# Patient Record
Sex: MALE | Race: WHITE | Employment: OTHER | URBAN - METROPOLITAN AREA
[De-identification: names, ages, dates, MRNs, and addresses within clinical notes are randomized per-mention and may not be internally consistent; named-entity substitution may affect disease eponyms.]

---

## 2020-06-16 VITALS
DIASTOLIC BLOOD PRESSURE: 90 MMHG | BODY MASS INDEX: 30.48 KG/M2 | TEMPERATURE: 98.7 F | WEIGHT: 225 LBS | HEIGHT: 72 IN | SYSTOLIC BLOOD PRESSURE: 140 MMHG

## 2020-06-16 DIAGNOSIS — Z80.42 FAMILY HISTORY OF MALIGNANT NEOPLASM OF PROSTATE: ICD-10-CM

## 2020-06-16 RX ORDER — CIPROFLOXACIN 500 MG/1
TABLET ORAL 2 TIMES DAILY
COMMUNITY

## 2020-06-17 LAB — PSA, EXTERNAL: 0.9

## 2020-07-10 PROBLEM — N40.2 PROSTATE NODULE WITHOUT URINARY OBSTRUCTION: Status: ACTIVE | Noted: 2020-07-10

## 2020-07-21 ENCOUNTER — OFFICE VISIT (OUTPATIENT)
Dept: UROLOGY | Age: 60
End: 2020-07-21
Payer: COMMERCIAL

## 2020-07-21 VITALS — TEMPERATURE: 98.7 F

## 2020-07-21 DIAGNOSIS — N40.2 PROSTATE NODULE WITHOUT URINARY OBSTRUCTION: ICD-10-CM

## 2020-07-21 DIAGNOSIS — Z80.42 FAMILY HISTORY OF PROSTATE CANCER: Primary | ICD-10-CM

## 2020-07-21 PROCEDURE — 76942 ECHO GUIDE FOR BIOPSY: CPT | Performed by: UROLOGY

## 2020-07-21 PROCEDURE — 76872 US TRANSRECTAL: CPT | Performed by: UROLOGY

## 2020-07-21 PROCEDURE — 55700 PR BIOPSY OF PROSTATE,NEEDLE/PUNCH: CPT | Performed by: UROLOGY

## 2020-07-21 NOTE — PROGRESS NOTES
There are no care plans to display for this patient. Procedures   Prostate biopsy        Transrectal Ultrasound of Prostate with Biopsy of Prostate and/or Seminal Vesicles    Patient: Erlinda Safer                                                             Date: 7/21/2020    Indications:  PSA:  0.9    Palpable Nodule: yes    Procedure: The patient was given Cipro and Fleet enema prep prior to the procedure described below. He was placed in the left lateral decubitus position with knees drawn up. Anesthesia: Prostate Block - 1% Xylocaine 6cc was injected bilaterally at apex and base. An endorectal probe was placed in the rectum. Transverse and sagittal images of the prostate and seminal vesicles were obtained. Prostate measured. 3.57 x 4.42 x 2.65cm  Prostate volume 41.82cc     Using the needle guide on the probe, a biopsy needle was used to obtain biopsies of the prostate. If seen, abnormally appearing areas were targeted along with systematic biopsies from each side. Tissue cores were placed in formalin jars and sent for pathologic review. 12 zone template. The probe was removed and the patient was observed. He was advised to finish his prescribed antibiotics and avoid strenuous physical and sexual activity for several days. He was told to call our office if he developed fever, excessive or persistent blood in the urine/stool, or difficulty voiding. We arranged follow-up so that we could review the results of this biopsy. Biopsy-Number of cores:     14 samples, 12 zones. Additional samples from right apex.           Prostate Exam: Right apex hard  Prostatic Volume: 41.82 grams      Roya Buitrago MD

## 2020-07-21 NOTE — PATIENT INSTRUCTIONS
Take Tylenol for pain or low grade fever. 325mg, 500mg or 650mg acceptable doses. Avoid ibuprofen, aspirin, naproxen or other NSAIDs until no further blood seen. These medications can increase bleeding. Hydrate well. Expect blood in the stool 1-2 days. Expect blood in the urine or semen, off and on, for weeks.

## 2020-07-28 ENCOUNTER — OFFICE VISIT (OUTPATIENT)
Dept: UROLOGY | Age: 60
End: 2020-07-28
Payer: COMMERCIAL

## 2020-07-28 VITALS
HEIGHT: 72 IN | DIASTOLIC BLOOD PRESSURE: 88 MMHG | SYSTOLIC BLOOD PRESSURE: 152 MMHG | BODY MASS INDEX: 30.48 KG/M2 | WEIGHT: 225 LBS

## 2020-07-28 DIAGNOSIS — Z80.42 FAMILY HISTORY OF PROSTATE CANCER: Primary | ICD-10-CM

## 2020-07-28 DIAGNOSIS — N40.2 PROSTATE NODULE WITHOUT URINARY OBSTRUCTION: ICD-10-CM

## 2020-07-28 PROCEDURE — 99213 OFFICE O/P EST LOW 20 MIN: CPT | Performed by: UROLOGY

## 2020-07-28 NOTE — PROGRESS NOTES
HISTORY OF PRESENT ILLNESS  Xander Montoya is a 64 y.o. male. He is here for an abnormal prostate exam.   He has a hard area of the prostate with a normal PSA. NOEMY 6/16/2020 with a firm area on the right side of his prostate. PSA on 6/16/20 was 0.9 with %free 44.4. Prostate biopsy done 7/21/2020 was benign. Problem List  Date Reviewed: 7/28/2020          Codes Class Noted    Prostate nodule without urinary obstruction ICD-10-CM: N40.2  ICD-9-CM: 600.10  7/10/2020    Overview Addendum 7/28/2020 11:44 AM by Lexy Anderson NP     NOEMY 6/16/2020 with a firm area on the right side of his prostate. PSA on 6/16/20 was 0.9 with %free 44.4. Prostate biopsy done 7/21/2020 was benign. Family history of prostate cancer ICD-10-CM: Z80.42  ICD-9-CM: V16.42  6/16/2020    Overview Signed 6/16/2020  4:28 PM by Rissa Phillip     His brother was recently diagnosed with prostate cancer. 06- visit He wants to resume prostate cancer screening. He has an abnormal exam today and needs further evaluation. Review of Systems   All other systems reviewed and are negative. Physical Exam  Constitutional:       General: He is not in acute distress. Appearance: Normal appearance. HENT:      Head: Normocephalic. Nose: Nose normal.      Mouth/Throat:      Mouth: Mucous membranes are moist.   Eyes:      Pupils: Pupils are equal, round, and reactive to light. Neck:      Musculoskeletal: Normal range of motion and neck supple. Cardiovascular:      Rate and Rhythm: Normal rate. Pulmonary:      Effort: Pulmonary effort is normal. No respiratory distress. Breath sounds: Normal breath sounds. No wheezing or rhonchi. Genitourinary:     Prostate: Enlarged (1+firm, without dominant nodule today). Not tender. Prostate nodules: apex with bilateral nodules, probably vascular. Rectum: Normal. No mass. Musculoskeletal: Normal range of motion.    Skin:     General: Skin is warm and dry. Neurological:      General: No focal deficit present. Mental Status: He is alert and oriented to person, place, and time. Psychiatric:         Mood and Affect: Mood normal.         Behavior: Behavior normal.         Lab Results   Component Value Date/Time    PSA, External 0.9 06/17/2020         ASSESSMENT and PLAN  Diagnoses and all orders for this visit:    1. Family history of prostate cancer    2. Prostate nodule without urinary obstruction  Assessment & Plan:  Biopsy was benign. Ok to resume annual follow up. Orders:  -     PSA, DIAGNOSTIC (PROSTATE SPECIFIC AG)     Biopsy was benign. Ok to resume annual follow up.    Batshvea Alonso MD

## 2020-08-26 ENCOUNTER — OFFICE VISIT (OUTPATIENT)
Dept: UROLOGY | Age: 60
End: 2020-08-26
Payer: COMMERCIAL

## 2020-08-26 VITALS
WEIGHT: 216 LBS | TEMPERATURE: 98.1 F | HEIGHT: 72 IN | BODY MASS INDEX: 29.26 KG/M2 | DIASTOLIC BLOOD PRESSURE: 86 MMHG | SYSTOLIC BLOOD PRESSURE: 146 MMHG

## 2020-08-26 DIAGNOSIS — Z80.42 FAMILY HISTORY OF PROSTATE CANCER: ICD-10-CM

## 2020-08-26 DIAGNOSIS — N40.2 PROSTATE NODULE WITHOUT URINARY OBSTRUCTION: Primary | ICD-10-CM

## 2020-08-26 DIAGNOSIS — N20.0 KIDNEY STONE: ICD-10-CM

## 2020-08-26 PROCEDURE — 99214 OFFICE O/P EST MOD 30 MIN: CPT | Performed by: UROLOGY

## 2020-08-26 NOTE — ASSESSMENT & PLAN NOTE
6mm mid ureteral stone on right. We discussed ESWL. Check KUB. If visible then plan on ESWL. The patient was counseled on the risks, benefits and expected course of surgery. Surgery has risks of bleeding, infection, injury, pain, death or other consequences. Some specific risks of surgery were discussed as well. He wished to proceed.

## 2020-08-26 NOTE — PROGRESS NOTES
HISTORY OF PRESENT ILLNESS  Rachna Burch is a 64 y.o. male. He had an abnormal prostate exam and normal PSA. He underwent a prostate biopsy on 7/21/2020 which was benign. Yesterday he woke with sudden, severe stomach pain and right flank pain. He went to the ER and had a 6mm mid ureteral stone with associated hydronephrosis. He is comfortable on Percocet 1 q 6hrs. He feels the pain when he does not take it. He feels groggy and does not want to take it unless he has to. He is very thirsty. He drinks lots of unsweet tea. Problem List  Date Reviewed: 8/26/2020          Codes Class Noted    Kidney stone ICD-10-CM: N20.0  ICD-9-CM: 592.0  8/26/2020        Prostate nodule without urinary obstruction ICD-10-CM: N40.2  ICD-9-CM: 600.10  7/10/2020    Overview Addendum 7/28/2020 11:44 AM by Patrizia Chakraborty NP     NOEMY 6/16/2020 with a firm area on the right side of his prostate. PSA on 6/16/20 was 0.9 with %free 44.4. Prostate biopsy done 7/21/2020 was benign. Family history of prostate cancer ICD-10-CM: Z80.42  ICD-9-CM: V16.42  6/16/2020    Overview Addendum 8/25/2020  3:30 PM by Patrizia Chakraborty NP     His brother was recently diagnosed with prostate cancer. Review of Systems   All other systems reviewed and are negative. Physical Exam  Constitutional:       General: He is not in acute distress. Appearance: Normal appearance. HENT:      Head: Normocephalic and atraumatic. Nose: Nose normal.      Mouth/Throat:      Mouth: Mucous membranes are moist.   Eyes:      Extraocular Movements: Extraocular movements intact. Conjunctiva/sclera: Conjunctivae normal.      Pupils: Pupils are equal, round, and reactive to light. Cardiovascular:      Rate and Rhythm: Normal rate and regular rhythm. Pulmonary:      Effort: Pulmonary effort is normal. No respiratory distress. Breath sounds: Normal breath sounds. No wheezing or rhonchi.    Musculoskeletal: Normal range of motion. Skin:     General: Skin is warm and dry. Neurological:      General: No focal deficit present. Mental Status: He is alert and oriented to person, place, and time. Psychiatric:         Mood and Affect: Mood normal.         Behavior: Behavior normal.       ASSESSMENT and PLAN  Diagnoses and all orders for this visit:    1. Prostate nodule without urinary obstruction  Assessment & Plan:  Annual screening due 2021.      2. Family history of prostate cancer  Assessment & Plan:  Family h/o of prostate cancer. Continue monitoring      3. Kidney stone  Assessment & Plan:  6mm mid ureteral stone on right. We discussed ESWL. Check KUB. If visible then plan on ESWL. The patient was counseled on the risks, benefits and expected course of surgery. Surgery has risks of bleeding, infection, injury, pain, death or other consequences. Some specific risks of surgery were discussed as well. He wished to proceed.      Orders:  -     XR ABD PORT  1 V; Future       Noe Jaime MD

## 2020-08-27 ENCOUNTER — HOSPITAL ENCOUNTER (EMERGENCY)
Age: 60
Discharge: HOME | End: 2020-08-27
Attending: EMERGENCY MEDICINE | Admitting: EMERGENCY MEDICINE
Payer: COMMERCIAL

## 2020-08-27 ENCOUNTER — APPOINTMENT (OUTPATIENT)
Dept: CT IMAGING | Age: 60
End: 2020-08-27
Attending: EMERGENCY MEDICINE
Payer: COMMERCIAL

## 2020-08-27 VITALS
BODY MASS INDEX: 29.26 KG/M2 | HEART RATE: 75 BPM | RESPIRATION RATE: 18 BRPM | DIASTOLIC BLOOD PRESSURE: 94 MMHG | TEMPERATURE: 98.6 F | SYSTOLIC BLOOD PRESSURE: 165 MMHG | WEIGHT: 216 LBS | OXYGEN SATURATION: 98 % | HEIGHT: 72 IN

## 2020-08-27 DIAGNOSIS — N23 RENAL COLIC: ICD-10-CM

## 2020-08-27 DIAGNOSIS — N20.0 KIDNEY STONE: Primary | ICD-10-CM

## 2020-08-27 LAB
ALBUMIN SERPL-MCNC: 3.9 G/DL (ref 3.5–5)
ALBUMIN/GLOB SERPL: 0.9 {RATIO} (ref 1.1–2.2)
ALP SERPL-CCNC: 111 U/L (ref 45–117)
ALT SERPL-CCNC: 20 U/L (ref 12–78)
ANION GAP SERPL CALC-SCNC: 4 MMOL/L (ref 5–15)
APPEARANCE UR: CLEAR
AST SERPL-CCNC: 16 U/L (ref 15–37)
BACTERIA URNS QL MICRO: NEGATIVE /HPF
BASOPHILS # BLD: 0.1 K/UL (ref 0–0.1)
BASOPHILS NFR BLD: 0 % (ref 0–1)
BILIRUB SERPL-MCNC: 0.6 MG/DL (ref 0.2–1)
BILIRUB UR QL: NEGATIVE
BUN SERPL-MCNC: 16 MG/DL (ref 6–20)
BUN/CREAT SERPL: 13 (ref 12–20)
CALCIUM SERPL-MCNC: 9.5 MG/DL (ref 8.5–10.1)
CAOX CRY URNS QL MICRO: ABNORMAL
CHLORIDE SERPL-SCNC: 106 MMOL/L (ref 97–108)
CO2 SERPL-SCNC: 26 MMOL/L (ref 21–32)
COLOR UR: ABNORMAL
COMMENT, HOLDF: NORMAL
CREAT SERPL-MCNC: 1.25 MG/DL (ref 0.7–1.3)
DIFFERENTIAL METHOD BLD: ABNORMAL
EOSINOPHIL # BLD: 0.1 K/UL (ref 0–0.4)
EOSINOPHIL NFR BLD: 1 % (ref 0–7)
EPITH CASTS URNS QL MICRO: ABNORMAL /LPF
ERYTHROCYTE [DISTWIDTH] IN BLOOD BY AUTOMATED COUNT: 12 % (ref 11.5–14.5)
GLOBULIN SER CALC-MCNC: 4.3 G/DL (ref 2–4)
GLUCOSE SERPL-MCNC: 104 MG/DL (ref 65–100)
GLUCOSE UR STRIP.AUTO-MCNC: NEGATIVE MG/DL
HCT VFR BLD AUTO: 44 % (ref 36.6–50.3)
HGB BLD-MCNC: 15 G/DL (ref 12.1–17)
HGB UR QL STRIP: ABNORMAL
IMM GRANULOCYTES # BLD AUTO: 0.1 K/UL (ref 0–0.04)
IMM GRANULOCYTES NFR BLD AUTO: 1 % (ref 0–0.5)
KETONES UR QL STRIP.AUTO: 15 MG/DL
LEUKOCYTE ESTERASE UR QL STRIP.AUTO: NEGATIVE
LYMPHOCYTES # BLD: 1.2 K/UL (ref 0.8–3.5)
LYMPHOCYTES NFR BLD: 9 % (ref 12–49)
MCH RBC QN AUTO: 33.1 PG (ref 26–34)
MCHC RBC AUTO-ENTMCNC: 34.1 G/DL (ref 30–36.5)
MCV RBC AUTO: 97.1 FL (ref 80–99)
MONOCYTES # BLD: 0.9 K/UL (ref 0–1)
MONOCYTES NFR BLD: 7 % (ref 5–13)
NEUTS SEG # BLD: 11.8 K/UL (ref 1.8–8)
NEUTS SEG NFR BLD: 82 % (ref 32–75)
NITRITE UR QL STRIP.AUTO: NEGATIVE
NRBC # BLD: 0 K/UL (ref 0–0.01)
NRBC BLD-RTO: 0 PER 100 WBC
PH UR STRIP: 5 [PH] (ref 5–8)
PLATELET # BLD AUTO: 214 K/UL (ref 150–400)
PMV BLD AUTO: 10.3 FL (ref 8.9–12.9)
POTASSIUM SERPL-SCNC: 3.8 MMOL/L (ref 3.5–5.1)
PROT SERPL-MCNC: 8.2 G/DL (ref 6.4–8.2)
PROT UR STRIP-MCNC: NEGATIVE MG/DL
RBC # BLD AUTO: 4.53 M/UL (ref 4.1–5.7)
RBC #/AREA URNS HPF: ABNORMAL /HPF (ref 0–5)
SAMPLES BEING HELD,HOLD: NORMAL
SODIUM SERPL-SCNC: 136 MMOL/L (ref 136–145)
SP GR UR REFRACTOMETRY: 1.02 (ref 1–1.03)
UR CULT HOLD, URHOLD: NORMAL
UROBILINOGEN UR QL STRIP.AUTO: 0.2 EU/DL (ref 0.2–1)
WBC # BLD AUTO: 14.2 K/UL (ref 4.1–11.1)
WBC URNS QL MICRO: ABNORMAL /HPF (ref 0–4)

## 2020-08-27 PROCEDURE — 96375 TX/PRO/DX INJ NEW DRUG ADDON: CPT

## 2020-08-27 PROCEDURE — 81001 URINALYSIS AUTO W/SCOPE: CPT

## 2020-08-27 PROCEDURE — 96374 THER/PROPH/DIAG INJ IV PUSH: CPT

## 2020-08-27 PROCEDURE — 74176 CT ABD & PELVIS W/O CONTRAST: CPT

## 2020-08-27 PROCEDURE — 99284 EMERGENCY DEPT VISIT MOD MDM: CPT

## 2020-08-27 PROCEDURE — 80053 COMPREHEN METABOLIC PANEL: CPT

## 2020-08-27 PROCEDURE — 74011250636 HC RX REV CODE- 250/636: Performed by: EMERGENCY MEDICINE

## 2020-08-27 PROCEDURE — 85025 COMPLETE CBC W/AUTO DIFF WBC: CPT

## 2020-08-27 PROCEDURE — 36415 COLL VENOUS BLD VENIPUNCTURE: CPT

## 2020-08-27 RX ORDER — MORPHINE SULFATE 4 MG/ML
4 INJECTION INTRAVENOUS
Status: COMPLETED | OUTPATIENT
Start: 2020-08-27 | End: 2020-08-27

## 2020-08-27 RX ORDER — KETOROLAC TROMETHAMINE 30 MG/ML
15 INJECTION, SOLUTION INTRAMUSCULAR; INTRAVENOUS
Status: COMPLETED | OUTPATIENT
Start: 2020-08-27 | End: 2020-08-27

## 2020-08-27 RX ORDER — ONDANSETRON 2 MG/ML
4 INJECTION INTRAMUSCULAR; INTRAVENOUS
Status: COMPLETED | OUTPATIENT
Start: 2020-08-27 | End: 2020-08-27

## 2020-08-27 RX ORDER — HYDROMORPHONE HYDROCHLORIDE 2 MG/1
2 TABLET ORAL
Qty: 7 TAB | Refills: 0 | Status: SHIPPED | OUTPATIENT
Start: 2020-08-27 | End: 2020-08-28

## 2020-08-27 RX ORDER — ONDANSETRON 4 MG/1
4 TABLET, ORALLY DISINTEGRATING ORAL
Qty: 11 TAB | Refills: 0 | Status: SHIPPED | OUTPATIENT
Start: 2020-08-27

## 2020-08-27 RX ADMIN — SODIUM CHLORIDE 1000 ML: 9 INJECTION, SOLUTION INTRAVENOUS at 11:10

## 2020-08-27 RX ADMIN — MORPHINE SULFATE 4 MG: 4 INJECTION INTRAVENOUS at 11:10

## 2020-08-27 RX ADMIN — ONDANSETRON 4 MG: 2 INJECTION INTRAMUSCULAR; INTRAVENOUS at 11:10

## 2020-08-27 RX ADMIN — KETOROLAC TROMETHAMINE 15 MG: 30 INJECTION, SOLUTION INTRAMUSCULAR at 13:37

## 2020-08-27 NOTE — DISCHARGE INSTRUCTIONS
Patient Education        Kidney Stone: Care Instructions  Your Care Instructions     Kidney stones are formed when salts, minerals, and other substances normally found in the urine clump together. They can be as small as grains of sand or, rarely, as large as golf balls. While the stone is traveling through the ureter, which is the tube that carries urine from the kidney to the bladder, you will probably feel pain. The pain may be mild or very severe. You may also have some blood in your urine. As soon as the stone reaches the bladder, any intense pain should go away. If a stone is too large to pass on its own, you may need a medical procedure to help you pass the stone. The doctor has checked you carefully, but problems can develop later. If you notice any problems or new symptoms, get medical treatment right away. Follow-up care is a key part of your treatment and safety. Be sure to make and go to all appointments, and call your doctor if you are having problems. It's also a good idea to know your test results and keep a list of the medicines you take. How can you care for yourself at home? · Drink plenty of fluids, enough so that your urine is light yellow or clear like water. If you have kidney, heart, or liver disease and have to limit fluids, talk with your doctor before you increase the amount of fluids you drink. · Take pain medicines exactly as directed. Call your doctor if you think you are having a problem with your medicine. ? If the doctor gave you a prescription medicine for pain, take it as prescribed. ? If you are not taking a prescription pain medicine, ask your doctor if you can take an over-the-counter medicine. Read and follow all instructions on the label. · Your doctor may ask you to strain your urine so that you can collect your kidney stone when it passes. You can use a kitchen strainer or a tea strainer to catch the stone.  Store it in a plastic bag until you see your doctor again.  Preventing future kidney stones  Some changes in your diet may help prevent kidney stones. Depending on the cause of your stones, your doctor may recommend that you:  · Drink plenty of fluids, enough so that your urine is light yellow or clear like water. If you have kidney, heart, or liver disease and have to limit fluids, talk with your doctor before you increase the amount of fluids you drink. · Limit coffee, tea, and alcohol. Also avoid grapefruit juice. · Do not take more than the recommended daily dose of vitamins C and D.  · Avoid antacids such as Gaviscon, Maalox, Mylanta, or Tums. · Limit the amount of salt (sodium) in your diet. · Eat a balanced diet that is not too high in protein. · Limit foods that are high in a substance called oxalate, which can cause kidney stones. These foods include dark green vegetables, rhubarb, chocolate, wheat bran, nuts, cranberries, and beans. When should you call for help? Call your doctor now or seek immediate medical care if:  · You cannot keep down fluids. · Your pain gets worse. · You have a fever or chills. · You have new or worse pain in your back just below your rib cage (the flank area). · You have new or more blood in your urine. Watch closely for changes in your health, and be sure to contact your doctor if:  · You do not get better as expected. Where can you learn more? Go to http://www.gray.com/  Enter B868 in the search box to learn more about \"Kidney Stone: Care Instructions. \"  Current as of: August 12, 2019               Content Version: 12.5  © 5676-1838 Healthwise, Incorporated. Care instructions adapted under license by [employer] (which disclaims liability or warranty for this information). If you have questions about a medical condition or this instruction, always ask your healthcare professional. [address] any warranty or liability for your use of this information. Patient Education        Laser Lithotripsy: Before Your Procedure  What is laser lithotripsy? Laser lithotripsy is a procedure to treat kidney stones. It uses a laser to break the stones into very small pieces. These pieces can be removed during the procedure. Or they may pass out of the body in the urine. You may be awake for the procedure. Or you may have medicine to make you sleep. Either way, you will not feel pain. The doctor may use an X-ray to find the stone. First, the doctor puts a thin viewing scope with small tools, a camera, and a laser into your urethra. The urethra is the tube that carries urine from your bladder to the outside of your body. Then the doctor moves the scope and tools through your urethra and bladder into your ureter. Ureters are the tubes that carry urine from your kidneys to your bladder. If needed, your doctor moves the scope into your kidney. If the stone is large or is in your kidney, your doctor may need to make a small cut (incision) in your back and put a hollow tube into your kidney. In this case, the doctor uses the laser to break up the stone. Then he or she removes the pieces of the stone through the hollow tube. Your doctor may also place a small, flexible tube inside one of your ureters. This tube is called a stent. It helps the pieces of the stone pass through your body. If you get a stent, your doctor will usually remove it in a few weeks. Most people are able to go home the same day of the procedure. Follow-up care is a key part of your treatment and safety. Be sure to make and go to all appointments, and call your doctor if you are having problems. It's also a good idea to know your test results and keep a list of the medicines you take. How do you prepare for the procedure? Procedures can be stressful. This information will help you understand what you can expect. And it will help you safely prepare for your procedure.   Preparing for the procedure  · Be sure you have someone to take you home. Anesthesia and pain medicine will make it unsafe for you to drive or get home on your own. · Understand exactly what procedure is planned, along with the risks, benefits, and other options. · If you take aspirin or some other blood thinner, ask your doctor if you should stop taking it before your procedure. Make sure that you understand exactly what your doctor wants you to do. These medicines increase the risk of bleeding. · Tell your doctor ALL the medicines, vitamins, supplements, and herbal remedies you take. Some may increase the risk of problems during your procedure. Your doctor will tell you if you should stop taking any of them before the procedure and how soon to do it. · Make sure your doctor and the hospital have a copy of your advance directive. If you don't have one, you may want to prepare one. It lets others know your health care wishes. It's a good thing to have before any type of surgery or procedure. Procedures can be stressful. This information will help you understand what you can expect. And it will help you safely prepare for your procedure. What happens on the day of the procedure? · Follow the instructions exactly about when to stop eating and drinking. If you don't, your procedure may be canceled. If your doctor told you to take your medicines on the day of the procedure, take them with only a sip of water. · Take a bath or shower before you come in for your procedure. Do not apply lotions, perfumes, deodorants, or nail polish. · Take off all jewelry and piercings. And take out contact lenses, if you wear them. At the hospital or surgery center    · Bring a picture ID. · You may need to give a urine sample. This is to make sure that you do not have an infection. · Before the procedure, a health professional will clean the area around your urethra. He or she will also put numbing gel inside your urethra.   · You will be kept comfortable and safe by your anesthesia provider. The anesthesia may make you sleep. Or it may just numb the area being worked on. · The procedure will take a few hours. When should you call your doctor? · You have questions or concerns. · You don't understand how to prepare for your procedure. · You become ill before the procedure (such as fever, flu, or a cold). · You need to reschedule or have changed your mind about having the procedure. Where can you learn more? Go to http://vasquez-malena.info/  Enter P994 in the search box to learn more about \"Laser Lithotripsy: Before Your Procedure. \"  Current as of: August 12, 2019               Content Version: 12.5  © 5041-8790 Healthwise, Incorporated. Care instructions adapted under license by [employer] (which disclaims liability or warranty for this information). If you have questions about a medical condition or this instruction, always ask your healthcare professional. [address] any warranty or liability for your use of this information.

## 2020-08-27 NOTE — ED PROVIDER NOTES
HPI       62y M here with R flank pain. Started 3-4 days ago. Seen at [facility] ED 2 days ago and had a CT that showed a 6mm stone on the R side. Given zofran, percocet, and flomax. Had follow-up with urology (down in [facility]; Dr. Leanna Olvera). Pain not improved so called urology again today who asked him to get an xray to see where stone is located. Had this done as an outpatient this AM at [address] [facility] [address] [place] [address]. Then called back and was told to go to the ED if still in pain. Was told he could not have outpatient surgery for this until he had a covid test, and he felt he was too uncomfortable and could not wait. No fever. No vomiting but decreased PO intake. No hematuria. Pain is sharp in the R flank. No position of comfort. Pain doesn't radiate. Past Medical History:   Diagnosis Date    Calculus of kidney     Family history of malignant neoplasm of prostate 6/16/2020    His brother was recently diagnosed with prostate cancer. 06- visit He wants to resume prostate cancer screening. He has an abnormal exam today and needs further evaluation.        Past Surgical History:   Procedure Laterality Date    HX APPENDECTOMY      HX CIRCUMCISION      HX KNEE ARTHROSCOPY      HX OTHER SURGICAL      esophagus scraped    HX UROLOGICAL      urethral reconstruction    HX WISDOM TEETH EXTRACTION           Family History:   Problem Relation Age of Onset    Cancer Father     Cancer Brother        Social History     Socioeconomic History    Marital status: SINGLE     Spouse name: Not on file    Number of children: Not on file    Years of education: Not on file    Highest education level: Not on file   Occupational History    Occupation: self employed   Social Needs    Financial resource strain: Not on file    Food insecurity     Worry: Not on file     Inability: Not on file   [employer] needs     Medical: Not on file     Non-medical: Not on file   Tobacco Use    Smoking status: Never Smoker    Smokeless tobacco: Never Used   Substance and Sexual Activity    Alcohol use: Yes     Comment: occasional     Drug use: Never    Sexual activity: Not on file   Lifestyle    Physical activity     Days per week: Not on file     Minutes per session: Not on file    Stress: Not on file   Relationships    Social connections     Talks on phone: Not on file     Gets together: Not on file     Attends Alevism service: Not on file     Active member of club or organization: Not on file     Attends meetings of clubs or organizations: Not on file     Relationship status: Not on file    Intimate partner violence     Fear of current or ex partner: Not on file     Emotionally abused: Not on file     Physically abused: Not on file     Forced sexual activity: Not on file   Other Topics Concern     Service Not Asked    Blood Transfusions Not Asked    Caffeine Concern Not Asked    Occupational Exposure Not Asked   Lavenia Levels Hazards Not Asked    Sleep Concern Not Asked    Stress Concern Not Asked    Weight Concern Not Asked    Special Diet Not Asked    Back Care Not Asked    Exercise Not Asked    Bike Helmet Not Asked   [address] [facility] Not Asked    Self-Exams Not Asked   Social History Narrative    Not on file         ALLERGIES: Patient has no known allergies. Review of Systems   Review of Systems   Constitutional: (-) weight loss. HEENT: (-) stiff neck   Eyes: (-) discharge. Respiratory: (-) cough. Cardiovascular: (-) syncope. Gastrointestinal: (-) blood in stool. Genitourinary: (-) hematuria. Musculoskeletal: (-) myalgias. Neurological: (-) seizure. Skin: (-) petechiae  Lymph/Immunologic: (-) enlarged lymph nodes  All other systems reviewed and are negative.         Vitals:    08/27/20 1011   BP: (!) 181/103   Pulse: 63   Resp: 16   Temp: 98.3 °F (36.8 °C)   SpO2: 96%   Weight: 98 kg (216 lb)   Height: 6' (1.829 m)            Physical Exam Nursing note and vitals reviewed. Constitutional: oriented to person, place, and time. appears well-developed and well-nourished. No distress. Head: Normocephalic and atraumatic. Sclera anicteric  Nose: No rhinorrhea  Mouth/Throat: Oropharynx is clear and moist. Pharynx normal  Eyes: Conjunctivae are normal. Pupils are equal, round, and reactive to light. Right eye exhibits no discharge. Left eye exhibits no discharge. Neck: Painless normal range of motion. Neck supple. No LAD. Cardiovascular: Normal rate, regular rhythm, normal heart sounds and intact distal pulses. Exam reveals no gallop and no friction rub. No murmur heard. Pulmonary/Chest:  No respiratory distress. No wheezes. No rales. No rhonchi. No increased work of breathing. No accessory muscle use. Good air exchange throughout. Abdominal: soft, non-tender, no rebound or guarding. No hepatosplenomegaly. Normal bowel sounds throughout. Back: no tenderness to palpation, no deformities, no CVA tenderness  Extremities/Musculoskeletal: Normal range of motion. no tenderness. No edema. Distal extremities are neurovasc intact. Lymphadenopathy:   No adenopathy. Neurological:  Alert and oriented to person, place, and time. Coordination normal. CN 2-12 intact. Motor and sensory function intact. Skin: Skin is warm and dry. No rash noted. No pallor. MDM 62y M here with likely renal colic from 6mm kidney stone. Do not have access to imaging from a few days ago or today. Will check labs, give morphine, check CT and try to d/w his urologist.       Procedures    11:32 AM  Spoke with Dr. Kevon Leo NP - Dr. Christensen Pouch does not come to [facility] and is unable to provide any other kind of acute needs for pt at this time. 12:19 PM  Feeling better. Waiting on CT results. Labs ok.

## 2020-08-27 NOTE — ED TRIAGE NOTES
Triage: Pt arrives from his urologists office with CC of right flank pain. He was seen at the [facility] ER over the week and diagnosed with a kidney stone. He then followed up with his urologist this morning to schedule a lithotripsy but the pain is too severe and he is too uncomfortable to wait to schedule the procedure.

## 2020-08-27 NOTE — CONSULTS
Requesting Provider: Darren Hui, * - Reason for Consultation: \"kidney stone\"  Pre-existing [facility] Patient:   No                Patient: Eli Barfield MRN: [id number]  SSN: [id number]    YOB: 1960  Age: 64 y.o. Sex: male     Location: R40/R40       Code Status: No Order   PCP: None  - None   Emergency Contact:  Primary Emergency Contact: Shalini Ordonez, Home Phone: [phone]   Race/Shinto/Language: [facility] / [facility] / Basilia Ruby   Payor: Payor: Ivette Cruz / Plan: Isai Comber / Product Type: Commerical /    Prior Admission Data:         Hospitalized:  Hospital Day: 1 - Admitted 8/27/2020 10:34 AM   POD # * No surgery found *  by * Surgery not found * - Blood Loss: * No surgery found * * Surgery not found *     CONSULTANTS  IP CONSULT TO UROLOGY   ADMISSION DIAGNOSES  No diagnosis found. Assessment/Plan:       4 mm R distal ureteral stone  Moderate R hydro  Bladder diverticula  - ok for discharge from a urology perspective with spontaneous passage. Recommend NSAIDs at home or prescription for toradol with flomax and narcotics  - patient will need to f/u with his urologist in 1-2 weeks to see if R hydro has resolved. Patient aware. - discussed with patient if he develops fevers, unrelenting pain, or nausea/vomiting to go directly to ER for likely urgent stent placement    Supervising MD, Dr. Ron Griffith     CC: Flank Pain   HPI: [id number]. Per ED HPI, R flank pain started 3-4 days ago. Seen at [facility] ED 2 days ago and had a CT that showed a 6mm stone on the R side. Given zofran, percocet, and flomax. Had follow-up with urology (down in [place]; Dr. Christina Jean Baptiste). Pain not improved so called urology again today who asked him to get an xray to see where stone is located. Had this done as an outpatient this AM at [address] [place] [address]. Then called back and was told to go to the ED if still in pain.  Was told he could not have outpatient surgery for this until he had a covid test, and he felt he was too uncomfortable and could not wait. No fever. No vomiting but decreased PO intake. No hematuria. Pain is sharp in the R flank. No position of comfort. Pain doesn't radiate. [facility] was consulted regarding 4 mm right distal ureteral stone causing moderate R hydro. Patient was last seen at [address] [place] [address] in  by Dr. Jerome Kenney. Reports sharp R flank pain x2 days that is currently controlled with toradol and morphine. Denies fevers/chills, hematuria, dysuria, or irritative voiding symptoms. Reports he has not taken his prescribed flomax because he was waiting to discuss with a urologist. NPO since last evening. To note patient had a prostate biopsy 3 weeks ago which was negative. Discussed several treatment options with patient. One option was to be discharged home with flomax, toradol, and narcotics with f/u at [facility] in the AM for KUB and add-on for possible R CRULS or ESWL. Patient reports [facility] does not take his insurance which is why he sees a urologist in [facility]. I then discussed possible admission for a R CRULS and stent placement at [facility] in the AM, however patient does not want to be admitted and is beginning to feel better. Suggested spontaneous passage with flomax and it appears the patient is leaning more toward this route. Afebrile; 181/103  WBC: 14.2  Cr: 1.25  UA: clear  +toradol    CT abd/pelv wo:  KIDNEYS/URETERS: There is a 4 mm stone in the right distal ureter causing  moderate right hydroureteronephrosis and perinephric inflammation. URINARY BLADDER: Bladder diverticula are noted. IMPRESSION:  4 mm stone right distal ureter causing moderate right hydroureteronephrosis and  perinephric inflammatory change. Bladder diverticula.         Temp (24hrs), Av.3 °F (36.8 °C), Min:98.3 °F (36.8 °C), Max:98.3 °F (36.8 °C)    Urinary Status: Voiding, Right flank pain  Creatinine   Date/Time Value Ref Range Status   2020 10:54 AM 1.25 0.70 - 1.30 MG/DL Final     Current Antimicrobial Therapy (168h ago, onward)    None        Key Anti-Platelet Anticoagulant Meds     The patient is on no antiplatelet meds or anticoagulants. Diet: No diet orders on file -       Labs     Lab Results   Component Value Date/Time    WBC 14.2 (H) 08/27/2020 10:54 AM    HCT 44.0 08/27/2020 10:54 AM    PLATELET 140 94/23/6523 10:54 AM    Sodium 136 08/27/2020 10:54 AM    Potassium 3.8 08/27/2020 10:54 AM    Chloride 106 08/27/2020 10:54 AM    CO2 26 08/27/2020 10:54 AM    BUN 16 08/27/2020 10:54 AM    Creatinine 1.25 08/27/2020 10:54 AM    Glucose 104 (H) 08/27/2020 10:54 AM    Calcium 9.5 08/27/2020 10:54 AM     UA:   Lab Results   Component Value Date/Time    Color YELLOW/STRAW 08/27/2020 11:55 AM    Appearance CLEAR 08/27/2020 11:55 AM    Specific gravity 1.018 08/27/2020 11:55 AM    pH (UA) 5.0 08/27/2020 11:55 AM    Protein Negative 08/27/2020 11:55 AM    Glucose Negative 08/27/2020 11:55 AM    Ketone 15 (A) 08/27/2020 11:55 AM    Bilirubin Negative 08/27/2020 11:55 AM    Urobilinogen 0.2 08/27/2020 11:55 AM    Nitrites Negative 08/27/2020 11:55 AM    Leukocyte Esterase Negative 08/27/2020 11:55 AM    Epithelial cells FEW 08/27/2020 11:55 AM    Bacteria Negative 08/27/2020 11:55 AM    WBC 0-4 08/27/2020 11:55 AM    RBC 5-10 08/27/2020 11:55 AM     Imaging     Results for orders placed during the hospital encounter of 08/27/20   CT ABD PELV WO CONT    Narrative EXAM: CT ABD PELV WO CONT    INDICATION: R flank pain    COMPARISON: None    CONTRAST:  None. TECHNIQUE:   Thin axial images were obtained through the abdomen and pelvis. Coronal and  sagittal reformats were generated. Oral contrast was not administered. CT dose  reduction was achieved through use of a standardized protocol tailored for this  examination and automatic exposure control for dose modulation.      The absence of intravenous contrast material reduces the sensitivity for  evaluation of the vasculature and solid organs. FINDINGS:   LOWER THORAX: No significant abnormality in the incidentally imaged lower chest.  LIVER: No mass. BILIARY TREE: Gallbladder is within normal limits. CBD is not dilated. SPLEEN: within normal limits. PANCREAS: No focal abnormality. ADRENALS: Unremarkable. KIDNEYS/URETERS: There is a 4 mm stone in the right distal ureter causing  moderate right hydroureteronephrosis and perinephric inflammation. STOMACH: Moderate hiatal hernia. SMALL BOWEL: No dilatation or wall thickening. COLON: No dilatation or wall thickening. APPENDIX: Surgically absent. PERITONEUM: No ascites or pneumoperitoneum. RETROPERITONEUM: No lymphadenopathy or aortic aneurysm. REPRODUCTIVE ORGANS: There is no pelvic mass or lymphadenopathy. URINARY BLADDER: Bladder diverticula are noted. BONES: Degenerative changes are seen in the lumbar spine and hip joints  bilaterally. ABDOMINAL WALL: No mass or hernia. ADDITIONAL COMMENTS: N/A      Impression IMPRESSION:  4 mm stone right distal ureter causing moderate right hydroureteronephrosis and  perinephric inflammatory change. Bladder diverticula. US Results (most recent):  No results found for this or any previous visit. Cultures     All Micro Results     Procedure Component Value Units Date/Time    URINE CULTURE HOLD SAMPLE [[id number]] Collected:  08/27/20 1157    Order Status:  Completed Specimen:  Serum Updated:  08/27/20 1159     Urine culture hold       Urine on hold in Microbiology dept for 2 days. If unpreserved urine is submitted, it cannot be used for addtional testing after 24 hours, recollection will be required. Past History: (Complete 2+/3 areas)   No Known Allergies   No current facility-administered medications for this encounter. Current Outpatient Medications   Medication Sig    ciprofloxacin HCl (Cipro) 500 mg tablet Take  by mouth two (2) times a day.     sodium phosphate (Fleet Enema) 19-7 gram/118 mL enema Insert 1 Enema into rectum now. Prior to Admission medications    Medication Sig Start Date End Date Taking? Authorizing Provider   ciprofloxacin HCl (Cipro) 500 mg tablet Take  by mouth two (2) times a day. Provider, Historical   sodium phosphate (Fleet Enema) 19-7 gram/118 mL enema Insert 1 Enema into rectum now. Provider, Historical        PMHx:  has a past medical history of Calculus of kidney and Family history of malignant neoplasm of prostate (6/16/2020). PSurgHx:  has a past surgical history that includes hx circumcision; hx wisdom teeth extraction; hx urological; hx appendectomy; hx other surgical; and hx knee arthroscopy. PSocHx:  reports that he has never smoked. He has never used smokeless tobacco. He reports current alcohol use. He reports that he does not use drugs.    ROS:  (Complete - 10 systems) - DENIES: Weightloss (Constitutional), Dry mouth (ENMT), Chest pain (CV), SOB (Respiratory), Constipation (GI), Weakness (MS), Pallor (Skin), TIA Sx (Neuro), Confusion (Psych), Easy bruising (Heme)    Physical Exam: (Comprehesive - 8+ 1995 Systems)     (1) Constitutional:  FIO2:   on SpO2: O2 Sat (%): 96 %  O2 Device: Room air    Patient Vitals for the past 24 hrs:   BP Temp Pulse Resp SpO2 Height Weight   08/27/20 1011 (!) 181/103 98.3 °F (36.8 °C) 63 16 96 % 6' (1.829 m) 98 kg (216 lb)                                                             Signed By: Aparna Shah NP  - August 27, 2020

## 2020-09-01 DIAGNOSIS — N20.0 KIDNEY STONE: ICD-10-CM

## 2020-09-01 NOTE — PROGRESS NOTES
When I tried to call schedule him for his ESWL he stated he was in so much pain and was going to the ER.

## 2020-09-02 NOTE — PROGRESS NOTES
LVM to check on status of surgery for pt. Asked for a call back to see how he was doing and find out if surgery needs to be scheduled.

## 2020-09-08 VITALS
BODY MASS INDEX: 30.48 KG/M2 | HEIGHT: 72 IN | WEIGHT: 225 LBS | DIASTOLIC BLOOD PRESSURE: 90 MMHG | SYSTOLIC BLOOD PRESSURE: 140 MMHG | TEMPERATURE: 98.7 F

## 2021-07-20 ENCOUNTER — TELEPHONE (OUTPATIENT)
Dept: UROLOGY | Age: 61
End: 2021-07-20

## 2021-07-26 ENCOUNTER — TELEPHONE (OUTPATIENT)
Dept: UROLOGY | Age: 61
End: 2021-07-26

## 2021-07-26 NOTE — TELEPHONE ENCOUNTER
LVM for patient letting him know that I received his message about needing to cancel his appt on tomorrow and I got it canceled for him

## 2022-03-19 PROBLEM — Z80.42 FAMILY HISTORY OF PROSTATE CANCER: Status: ACTIVE | Noted: 2020-06-16

## 2022-03-19 PROBLEM — N40.2 PROSTATE NODULE WITHOUT URINARY OBSTRUCTION: Status: ACTIVE | Noted: 2020-07-10

## 2022-03-20 PROBLEM — N20.0 KIDNEY STONE: Status: ACTIVE | Noted: 2020-08-26

## 2023-05-19 RX ORDER — CIPROFLOXACIN 500 MG/1
TABLET, FILM COATED ORAL 2 TIMES DAILY
COMMUNITY

## 2023-05-19 RX ORDER — SODIUM PHOSPHATE,MONO-DIBASIC 19G-7G/118
1 ENEMA (ML) RECTAL
COMMUNITY

## 2023-05-19 RX ORDER — ONDANSETRON 4 MG/1
4 TABLET, ORALLY DISINTEGRATING ORAL EVERY 8 HOURS PRN
COMMUNITY
Start: 2020-08-27

## 2024-11-01 ENCOUNTER — OFFICE VISIT (OUTPATIENT)
Age: 64
End: 2024-11-01
Payer: COMMERCIAL

## 2024-11-01 VITALS
DIASTOLIC BLOOD PRESSURE: 81 MMHG | BODY MASS INDEX: 30.1 KG/M2 | HEIGHT: 71 IN | WEIGHT: 215 LBS | SYSTOLIC BLOOD PRESSURE: 135 MMHG | HEART RATE: 43 BPM

## 2024-11-01 DIAGNOSIS — N40.2 PROSTATE NODULE WITHOUT URINARY OBSTRUCTION: ICD-10-CM

## 2024-11-01 DIAGNOSIS — N40.2 PROSTATE NODULE WITHOUT URINARY OBSTRUCTION: Primary | ICD-10-CM

## 2024-11-01 DIAGNOSIS — N20.0 KIDNEY STONE: ICD-10-CM

## 2024-11-01 DIAGNOSIS — Z80.42 FAMILY HISTORY OF PROSTATE CANCER: ICD-10-CM

## 2024-11-01 DIAGNOSIS — Z12.5 ENCOUNTER FOR SCREENING PROSTATE SPECIFIC ANTIGEN (PSA) MEASUREMENT: ICD-10-CM

## 2024-11-01 PROCEDURE — 99204 OFFICE O/P NEW MOD 45 MIN: CPT | Performed by: UROLOGY

## 2024-11-01 NOTE — PROGRESS NOTES
Chief Complaint   Patient presents with    prostate exam     1. Have you been to the ER, urgent care clinic since your last visit?  Hospitalized since your last visit?No    2. Have you seen or consulted any other health care providers outside of the [facility] since your last visit?  Include any pap smears or colon screening. No  /81   Pulse (!) 43   Ht 1.803 m (5' 11\")   Wt 97.5 kg (215 lb)   BMI 29.99 kg/m²

## 2024-11-01 NOTE — ASSESSMENT & PLAN NOTE
He has a right lobe nodule, slightly different that previously.  We will check an MRI of the prostate.  We discussed that if it were abnormal, we would proceed to a biopsy.

## 2024-11-01 NOTE — PROGRESS NOTES
HISTORY OF PRESENT ILLNESS  Arthur Milner is a 64 y.o. male.   has a past medical history of Calculus of kidney and Family history of malignant neoplasm of prostate.  has a past surgical history that includes [place] tooth extraction; Urological Surgery; Appendectomy; Circumcision; other surgical history; prostate biopsy, needle, saturation sampling (07/21/2020); and Knee arthroscopy.  Chief Complaint   Patient presents with    prostate exam     He had an abnormal prostate exam and normal PSA.  He underwent a prostate biopsy on 7/21/2020 which was benign.  It has been over 4 years since he was seen.     He had a kidney stone in 2020.  He has not had pain since then.          1. Prostate nodule without urinary obstruction  Overview:  SIMIN 6/16/2020 with a firm area on the right side of his prostate.   PSA on 6/16/20 was 0.9 with %free 44.4.  Prostate biopsy done 7/21/2020 was benign.      Assessment & Plan:   He has a right lobe nodule, slightly different that previously.  We will check an MRI of the prostate.  We discussed that if it were abnormal, we would proceed to a biopsy.   Orders:  -     PSA, Diagnostic; Future  -     MRI PROSTATE W WO CONTRAST; Future  2. Kidney stone  Overview:  8/26/2020: 6mm stone mid ureter on the right side.    KUB 8/27/21: subcentimeter calcific density over the right mid pelvis.        Assessment & Plan:  No further pain or problems.  Probably passed.   3. Family history of prostate cancer  Overview:  His brother 8 years older was treated for prostate cancer.    Assessment & Plan:  Continue screening and monitoring.   Orders:  -     PSA, Diagnostic; Future  4. Encounter for screening prostate specific antigen (PSA) measurement  -     PSA, Diagnostic; Future      No Known Allergies   Prior to Admission medications    Medication Sig Start Date End Date Taking? Authorizing Provider   ciprofloxacin (CIPRO) 500 MG tablet Take by mouth 2 times daily  Patient not taking: Reported on

## 2024-11-02 LAB — PSA SERPL-MCNC: 1.3 NG/ML (ref 0–4)

## 2024-11-22 ENCOUNTER — HOSPITAL ENCOUNTER (OUTPATIENT)
Facility: HOSPITAL | Age: 64
Discharge: HOME | End: 2024-11-22
Attending: UROLOGY
Payer: COMMERCIAL

## 2024-11-22 DIAGNOSIS — N40.2 PROSTATE NODULE WITHOUT URINARY OBSTRUCTION: ICD-10-CM

## 2024-11-22 PROCEDURE — 6360000004 HC RX CONTRAST MEDICATION: Performed by: UROLOGY

## 2024-11-22 PROCEDURE — 2580000003 HC RX 258: Performed by: UROLOGY

## 2024-11-22 PROCEDURE — 72197 MRI PELVIS W/O & W/DYE: CPT

## 2024-11-22 PROCEDURE — A9579 GAD-BASE MR CONTRAST NOS,1ML: HCPCS | Performed by: UROLOGY

## 2024-11-22 RX ORDER — SODIUM CHLORIDE 9 MG/ML
INJECTION, SOLUTION INTRAVENOUS ONCE
Status: COMPLETED | OUTPATIENT
Start: 2024-11-22 | End: 2024-11-22

## 2024-11-22 RX ADMIN — GADOTERIDOL 20 ML: 279.3 INJECTION, SOLUTION INTRAVENOUS at 07:33

## 2024-11-22 RX ADMIN — SODIUM CHLORIDE: 9 INJECTION, SOLUTION INTRAVENOUS at 07:33
